# Patient Record
Sex: MALE | Race: WHITE | Employment: FULL TIME | URBAN - METROPOLITAN AREA
[De-identification: names, ages, dates, MRNs, and addresses within clinical notes are randomized per-mention and may not be internally consistent; named-entity substitution may affect disease eponyms.]

---

## 2020-01-07 ENCOUNTER — THERAPY VISIT (OUTPATIENT)
Dept: PHYSICAL THERAPY | Facility: CLINIC | Age: 20
End: 2020-01-07
Payer: OTHER MISCELLANEOUS

## 2020-01-07 DIAGNOSIS — M54.50 BILATERAL LOW BACK PAIN WITHOUT SCIATICA: ICD-10-CM

## 2020-01-07 PROCEDURE — 97110 THERAPEUTIC EXERCISES: CPT | Mod: GP | Performed by: PHYSICAL THERAPIST
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
## 2020-01-09 ENCOUNTER — THERAPY VISIT (OUTPATIENT)
Dept: PHYSICAL THERAPY | Facility: CLINIC | Age: 20
End: 2020-01-09
Payer: OTHER MISCELLANEOUS

## 2020-01-09 DIAGNOSIS — M54.50 BILATERAL LOW BACK PAIN WITHOUT SCIATICA: ICD-10-CM

## 2020-01-09 PROCEDURE — 97110 THERAPEUTIC EXERCISES: CPT | Mod: GP | Performed by: PHYSICAL THERAPIST

## 2020-01-13 ENCOUNTER — THERAPY VISIT (OUTPATIENT)
Dept: PHYSICAL THERAPY | Facility: CLINIC | Age: 20
End: 2020-01-13
Payer: OTHER MISCELLANEOUS

## 2020-01-13 DIAGNOSIS — M54.50 BILATERAL LOW BACK PAIN WITHOUT SCIATICA: ICD-10-CM

## 2020-01-13 PROCEDURE — 97110 THERAPEUTIC EXERCISES: CPT | Mod: GP | Performed by: PHYSICAL THERAPIST

## 2020-01-17 ENCOUNTER — THERAPY VISIT (OUTPATIENT)
Dept: PHYSICAL THERAPY | Facility: CLINIC | Age: 20
End: 2020-01-17
Payer: OTHER MISCELLANEOUS

## 2020-01-17 DIAGNOSIS — M54.50 BILATERAL LOW BACK PAIN WITHOUT SCIATICA: ICD-10-CM

## 2020-01-17 PROCEDURE — 97110 THERAPEUTIC EXERCISES: CPT | Mod: GP | Performed by: PHYSICAL THERAPIST

## 2020-01-17 NOTE — LETTER
"[facility] [place]  [address]  [place] [address]  [phone]    2020    Re: Rodolfo Haley   :   2000  MRN:  [id number]   REFERRING PHYSICIAN:   [facility] [place]    Date of Initial Evaluation:  20  Visits:  Rxs Used: 4  Reason for Referral:  Bilateral low back pain without sciatica    PROGRESS  REPORT  Progress reporting period is from 20 to 20.     SUBJECTIVE  Subjective: pt reports his low back felt \"fine\" yesterday but woke up today and had more pain. admits he picked up a lot of cases yesterday at work up to 40# a few times.   Current Pain level: 710   Initial Pain level: 610   The objective findings are from DOS 20.    OBJECTIVE  Objective: AROM lumbar: full AROM lumbar flx, ext, bilateral sidebending with some mild R sided \"pulling\" with all movements. slump:neg SLR:neg      ASSESSMENT/PLAN  Updated problem list and treatment plan: Diagnosis 1:  Low back pain   STG/LTGs have been met or progress has been made towards goals:  No change, currently still on lifting restrictions  Assessment of Progress: The patient's condition has potential to improve.  Self Management Plans:  Patient has been instructed in a home treatment program.  Rodolfo continues to require the following intervention to meet STG and LTG's: PT  The patient is returning to your office for a recheck appointment.    Recommendations:  Rodolfo has been seen for PT 4 times over the past 10days for low back pain and overall reports no gross change. He admits to feeling better overall but then worked yesterday and lifted 40# multiple times which increased pain after and into today. Currently he would benefit from continued PT for core strengthening and posture during lifting. We have 2 remaining visits from original PT order, he would benefit from 6 additional visits after these remaining 2 visits are completed.    Thank you for your referral.    INQUIRIES  Therapist: Juan Rinaldi, ADAN JAMES " [place] [facility] [place]  [address]  [place] [address]  Phone: [phone]  Fax: [phone]

## 2020-01-20 ENCOUNTER — THERAPY VISIT (OUTPATIENT)
Dept: PHYSICAL THERAPY | Facility: CLINIC | Age: 20
End: 2020-01-20
Payer: OTHER MISCELLANEOUS

## 2020-01-20 DIAGNOSIS — M54.50 BILATERAL LOW BACK PAIN WITHOUT SCIATICA: ICD-10-CM

## 2020-01-20 PROCEDURE — 97110 THERAPEUTIC EXERCISES: CPT | Mod: GP | Performed by: PHYSICAL THERAPIST

## 2020-01-23 ENCOUNTER — THERAPY VISIT (OUTPATIENT)
Dept: PHYSICAL THERAPY | Facility: CLINIC | Age: 20
End: 2020-01-23
Payer: OTHER MISCELLANEOUS

## 2020-01-23 DIAGNOSIS — M54.50 BILATERAL LOW BACK PAIN WITHOUT SCIATICA: ICD-10-CM

## 2020-01-23 PROCEDURE — 97110 THERAPEUTIC EXERCISES: CPT | Mod: GP | Performed by: PHYSICAL THERAPIST

## 2020-01-28 ENCOUNTER — THERAPY VISIT (OUTPATIENT)
Dept: PHYSICAL THERAPY | Facility: CLINIC | Age: 20
End: 2020-01-28
Payer: OTHER MISCELLANEOUS

## 2020-01-28 DIAGNOSIS — M54.50 BILATERAL LOW BACK PAIN WITHOUT SCIATICA: ICD-10-CM

## 2020-01-28 PROCEDURE — 97110 THERAPEUTIC EXERCISES: CPT | Mod: GP | Performed by: PHYSICAL THERAPIST

## 2020-03-12 PROBLEM — M54.50 BILATERAL LOW BACK PAIN WITHOUT SCIATICA: Status: RESOLVED | Noted: 2020-01-07 | Resolved: 2020-03-12

## 2020-03-12 NOTE — PROGRESS NOTES
"Subjective:  HPI  Physical Exam                    Objective:  System    Physical Exam    General     ROS    Assessment/Plan:    DISCHARGE REPORT    Progress reporting period is from 1/17/20 to 1/28/20.     SUBJECTIVE  Subjective: pt reports the back has been \"all right\". has been working but not doing anything heavy.   Current Pain level: 3/10   Initial Pain level: 6/10        ;   ,     Patient has failed to return to therapy so current objective findings are unknown.    OBJECTIVE         ASSESSMENT/PLAN  Updated problem list and treatment plan: Diagnosis 1:  Low back pain  STG/LTGs have been met or progress has been made towards goals:  Yes, progressing towards return to painfree liftnig/carrying  Assessment of Progress: The patient's condition has potential to improve.  The patient has not returned to therapy. Current status is unknown.  Self Management Plans:  Patient has been instructed in a home treatment program.  Rodolfo continues to require the following intervention to meet STG and LTG's: HEP  The patient failed to complete scheduled/ordered appointments so current information is unknown.    Recommendations:  This patient is ready to be discharged from therapy and continue their home treatment program.    Please refer to the daily flowsheet for treatment today, total treatment time and time spent performing 1:1 timed codes.    "

## 2021-07-24 NOTE — PROGRESS NOTES
"Subjective:  HPI                    Objective:  System    Physical Exam    General     ROS    Assessment/Plan:    PROGRESS  REPORT    Progress reporting period is from 1/7/20 to 1/17/20.     SUBJECTIVE  Subjective: pt reports his low back felt \"fine\" yesterday but woke up today and had more pain. admits he picked up a lot of cases yesterday at work up to 40# a few times.   Current Pain level: 7/10   Initial Pain level: 6/10        ;   ,     The objective findings are from DOS 1/17/20.    OBJECTIVE  Objective: AROM lumbar: full AROM lumbar flx, ext, bilateral sidebending with some mild R sided \"pulling\" with all movements. slump:neg SLR:neg      ASSESSMENT/PLAN  Updated problem list and treatment plan: Diagnosis 1:  Low back pain   STG/LTGs have been met or progress has been made towards goals:  No change, currently still on lifting restrictions  Assessment of Progress: The patient's condition has potential to improve.  Self Management Plans:  Patient has been instructed in a home treatment program.  Rodolfo continues to require the following intervention to meet STG and LTG's: PT  The patient is returning to your office for a recheck appointment.    Recommendations:  Rodolfo has been seen for PT 4 times over the past 10days for low back pain and overall reports no gross change. He admits to feeling better overall but then worked yesterday and lifted 40# multiple times which increased pain after and into today. Currently he would benefit from continued PT for core strengthening and posture during lifting. We have 2 remaining visits from original PT order, he would benefit from 6 additional visits after these remaining 2 visits are completed.    Please refer to the daily flowsheet for treatment today, total treatment time and time spent performing 1:1 timed codes.    "
24-Jul-2021 20:00